# Patient Record
Sex: MALE | Race: BLACK OR AFRICAN AMERICAN | NOT HISPANIC OR LATINO | Employment: FULL TIME | URBAN - METROPOLITAN AREA
[De-identification: names, ages, dates, MRNs, and addresses within clinical notes are randomized per-mention and may not be internally consistent; named-entity substitution may affect disease eponyms.]

---

## 2022-01-13 ENCOUNTER — OFFICE VISIT (OUTPATIENT)
Dept: UROLOGY | Facility: CLINIC | Age: 37
End: 2022-01-13
Attending: UROLOGY
Payer: COMMERCIAL

## 2022-01-13 VITALS — DIASTOLIC BLOOD PRESSURE: 79 MMHG | HEART RATE: 72 BPM | SYSTOLIC BLOOD PRESSURE: 130 MMHG

## 2022-01-13 DIAGNOSIS — R10.30 INGUINAL PAIN, UNSPECIFIED LATERALITY: Primary | ICD-10-CM

## 2022-01-13 PROCEDURE — 99203 OFFICE O/P NEW LOW 30 MIN: CPT | Mod: S$GLB,,, | Performed by: UROLOGY

## 2022-01-13 PROCEDURE — 99203 PR OFFICE/OUTPT VISIT, NEW, LEVL III, 30-44 MIN: ICD-10-PCS | Mod: S$GLB,,, | Performed by: UROLOGY

## 2022-01-13 NOTE — PROGRESS NOTES
Subjective:      Andre Colvin is a 36 y.o. male who was self-referred for evaluation of groin pain.    He reports left sided groin pain for 1 week which has been intermittent, not associated with movement. Rates the pain 2/10, more discomfort. He reports pain in left testicle for a couple days which has now resolved. He also reports delayed ejaculation. He also reports urinary frequency associated with 160oz of liquids per day. He denies dysuria, hematuria, purulent drainage from penis.    The following portions of the patient's history were reviewed and updated as appropriate: allergies, current medications, past family history, past medical history, past social history, past surgical history and problem list.    Review of Systems  Constitutional: no fever or chills  ENT: no nasal congestion or sore throat  Respiratory: no cough or shortness of breath  Cardiovascular: no chest pain or palpitations  Gastrointestinal: no nausea or vomiting, tolerating diet  Genitourinary: as per HPI  Hematologic/Lymphatic: no easy bruising or lymphadenopathy  Musculoskeletal: no arthralgias or myalgias  Neurological: no seizures or tremors  Behavioral/Psych: no auditory or visual hallucinations     Objective:   Vitals: /79 (BP Location: Right arm, Patient Position: Sitting, BP Method: Large (Automatic))   Pulse 72     Physical Exam   General: alert and oriented, no acute distress  Head: normocephalic, atraumatic  Neck: supple, no lymphadenopathy, normal ROM, no masses  Respiratory: Symmetric expansion, non-labored breathing  Cardiovascular: regular rate and rhythm, nomal pulses, no peripheral edema  Abdomen: soft, non tender, non distended, no palpable masses, no hernias, no hepatomegaly or splenomegaly  Genitourinary:   Penis: circumcised, normal, no lesions, patent orthotopic meatus, no plaques  Scrotum: no rashes or skin changes;   Testes: descended bilaterally, no masses, nontender, normal epididymides bilaterally,  no hydroceles  Lymphatic: no inguinal nodes  Skin: normal coloration and turgor, no rashes, no suspicious skin lesions noted  Neuro: alert and oriented x3, no gross deficits  Psych: normal judgment and insight, normal mood/affect and non-anxious    Lab Review   Urinalysis demonstrates negative for all components  Lab Results   Component Value Date    WBC 5.83 02/01/2014    HGB 14.2 02/01/2014    HCT 43.8 02/01/2014    MCV 84 02/01/2014     02/01/2014     Lab Results   Component Value Date    CREATININE 1.0 02/01/2014    BUN 11 02/01/2014     No results found for: PSA  Imaging N/A    Assessment:   No diagnosis found.    Plan:   1. Discussed conservative measures for relieving testicular pain - scrotal support, ibuprofen, scrotal elevation, ice packs  2. Send urine for STI tests  3. RTC PRN

## 2022-08-29 ENCOUNTER — OFFICE VISIT (OUTPATIENT)
Dept: PRIMARY CARE CLINIC | Facility: CLINIC | Age: 37
End: 2022-08-29
Payer: COMMERCIAL

## 2022-08-29 VITALS
SYSTOLIC BLOOD PRESSURE: 130 MMHG | RESPIRATION RATE: 18 BRPM | HEART RATE: 76 BPM | WEIGHT: 200.75 LBS | DIASTOLIC BLOOD PRESSURE: 80 MMHG | HEIGHT: 69 IN | BODY MASS INDEX: 29.73 KG/M2 | OXYGEN SATURATION: 96 %

## 2022-08-29 DIAGNOSIS — Z72.0 TOBACCO USE: ICD-10-CM

## 2022-08-29 DIAGNOSIS — Z13.220 ENCOUNTER FOR LIPID SCREENING FOR CARDIOVASCULAR DISEASE: ICD-10-CM

## 2022-08-29 DIAGNOSIS — R51.9 NONINTRACTABLE HEADACHE, UNSPECIFIED CHRONICITY PATTERN, UNSPECIFIED HEADACHE TYPE: Primary | ICD-10-CM

## 2022-08-29 DIAGNOSIS — Z00.00 ANNUAL PHYSICAL EXAM: ICD-10-CM

## 2022-08-29 DIAGNOSIS — G89.29 BILATERAL CHRONIC KNEE PAIN: ICD-10-CM

## 2022-08-29 DIAGNOSIS — L70.0 ACNE VULGARIS: ICD-10-CM

## 2022-08-29 DIAGNOSIS — Z13.6 ENCOUNTER FOR LIPID SCREENING FOR CARDIOVASCULAR DISEASE: ICD-10-CM

## 2022-08-29 DIAGNOSIS — L70.0 BLACK HEAD: ICD-10-CM

## 2022-08-29 DIAGNOSIS — Z11.59 NEED FOR HEPATITIS C SCREENING TEST: ICD-10-CM

## 2022-08-29 DIAGNOSIS — M25.561 BILATERAL CHRONIC KNEE PAIN: ICD-10-CM

## 2022-08-29 DIAGNOSIS — M25.562 BILATERAL CHRONIC KNEE PAIN: ICD-10-CM

## 2022-08-29 DIAGNOSIS — B35.1 ONYCHOMYCOSIS OF TOENAIL: ICD-10-CM

## 2022-08-29 PROCEDURE — 99213 PR OFFICE/OUTPT VISIT, EST, LEVL III, 20-29 MIN: ICD-10-PCS | Mod: S$PBB,,, | Performed by: INTERNAL MEDICINE

## 2022-08-29 PROCEDURE — 99213 OFFICE O/P EST LOW 20 MIN: CPT | Mod: S$PBB,,, | Performed by: INTERNAL MEDICINE

## 2022-08-29 PROCEDURE — 99999 PR PBB SHADOW E&M-EST. PATIENT-LVL V: ICD-10-PCS | Mod: PBBFAC,,, | Performed by: INTERNAL MEDICINE

## 2022-08-29 PROCEDURE — 99999 PR PBB SHADOW E&M-EST. PATIENT-LVL V: CPT | Mod: PBBFAC,,, | Performed by: INTERNAL MEDICINE

## 2022-08-29 RX ORDER — ERYTHROMYCIN AND BENZOYL PEROXIDE 30; 50 MG/G; MG/G
GEL TOPICAL 2 TIMES DAILY
Qty: 23.3 G | Refills: 0 | Status: SHIPPED | OUTPATIENT
Start: 2022-08-29

## 2022-08-29 RX ORDER — BUTALBITAL, ACETAMINOPHEN AND CAFFEINE 50; 325; 40 MG/1; MG/1; MG/1
1 TABLET ORAL EVERY 6 HOURS PRN
Qty: 30 TABLET | Refills: 0 | Status: SHIPPED | OUTPATIENT
Start: 2022-08-29 | End: 2022-09-28

## 2022-08-29 NOTE — PROGRESS NOTES
Subjective:       Patient ID: Andre Colvin is a 37 y.o. male.    Chief Complaint: Gastroesophageal Reflux, Annual Exam, Knee Pain (Bilateral), Headache, and Referral (Dermatology)    HPI  patient visit today for routine follow-up and annual exam he had chronic bilateral knee pain and fatigue tired left knee worse than right knee patient due contraction work on his feet all day long carry heavy stuffs he also have intermittent headache mainly around the left forehead left by also the back of the head hurt when cough he take over-the-counter medication night ibuprofen Tylenol which has been helping the pain and headache he also come pain of the fungus infection in the nail lose mainly to big toe bilaterally he denies short of breath chest pain dyspnea with exertion weight gain weight loss or any change in bowel habit  Review of Systems   Constitutional:  Negative for unexpected weight change.   Respiratory:  Negative for shortness of breath.    Cardiovascular:  Negative for chest pain.   Gastrointestinal:  Negative for abdominal pain.   Musculoskeletal:  Positive for arthralgias.   Psychiatric/Behavioral:  Negative for dysphoric mood.      Objective:      Physical Exam  Vitals and nursing note reviewed.   Constitutional:       General: He is not in acute distress.     Appearance: He is well-developed.   HENT:      Head: Normocephalic and atraumatic.      Right Ear: External ear normal.      Left Ear: External ear normal.      Nose: Nose normal.      Mouth/Throat:      Pharynx: No oropharyngeal exudate.   Eyes:      Extraocular Movements: Extraocular movements intact.      Conjunctiva/sclera: Conjunctivae normal.      Pupils: Pupils are equal, round, and reactive to light.   Neck:      Thyroid: No thyromegaly.   Cardiovascular:      Rate and Rhythm: Normal rate and regular rhythm.      Heart sounds: Normal heart sounds. No murmur heard.    No friction rub. No gallop.   Pulmonary:      Effort: Pulmonary effort  is normal. No respiratory distress.      Breath sounds: Normal breath sounds. No wheezing.   Abdominal:      General: Bowel sounds are normal. There is no distension.      Palpations: Abdomen is soft.      Tenderness: There is no abdominal tenderness.   Musculoskeletal:         General: Tenderness (A bilateral knee tenderness left worse than right no swelling no erythema no warmth to touch) present. No deformity. Normal range of motion.      Cervical back: Normal range of motion and neck supple.   Lymphadenopathy:      Cervical: No cervical adenopathy.   Skin:     General: Skin is warm and dry.      Findings: No erythema or rash.   Neurological:      Mental Status: He is alert and oriented to person, place, and time.   Psychiatric:         Mood and Affect: Mood normal.         Thought Content: Thought content normal.         Judgment: Judgment normal.       Assessment:       1. Nonintractable headache, unspecified chronicity pattern, unspecified headache type    2. Black head    3. Encounter for lipid screening for cardiovascular disease    4. Annual physical exam    5. Tobacco use    6. Onychomycosis of toenail    7. Bilateral chronic knee pain    8. Need for hepatitis C screening test    9. Acne vulgaris          Plan:       Nonintractable headache, unspecified chronicity pattern, unspecified headache type  -     butalbital-acetaminophen-caffeine -40 mg (FIORICET, ESGIC) -40 mg per tablet; Take 1 tablet by mouth every 6 (six) hours as needed for Headaches.  Dispense: 30 tablet; Refill: 0    Black head    Encounter for lipid screening for cardiovascular disease  -     Lipid Panel; Future; Expected date: 08/29/2022    Annual physical exam  -     CBC Auto Differential; Future; Expected date: 08/29/2022  -     Urinalysis  -     Comprehensive Metabolic Panel; Future; Expected date: 08/29/2022    Tobacco use  -     Ambulatory referral/consult to Smoking Cessation Program; Future; Expected date: 09/05/2022  -      X-Ray Chest PA And Lateral; Future; Expected date: 08/29/2022    Onychomycosis of toenail  -     Comprehensive Metabolic Panel; Future; Expected date: 08/29/2022    Bilateral chronic knee pain  -     X-Ray Knee 3 View Bilateral; Future; Expected date: 08/29/2022    Need for hepatitis C screening test  -     Hepatitis C Antibody; Future; Expected date: 08/29/2022  -     HIV 1/2 Ag/Ab (4th Gen); Future; Expected date: 08/29/2022    Acne vulgaris  -     benzoyl peroxide-erythromycin (BENZAMYCIN) gel; Apply topically 2 (two) times daily. Apply lesions forehead BID  Dispense: 23.3 g; Refill: 0      Medication List with Changes/Refills   New Medications    BENZOYL PEROXIDE-ERYTHROMYCIN (BENZAMYCIN) GEL    Apply topically 2 (two) times daily. Apply lesions forehead BID    BUTALBITAL-ACETAMINOPHEN-CAFFEINE -40 MG (FIORICET, ESGIC) -40 MG PER TABLET    Take 1 tablet by mouth every 6 (six) hours as needed for Headaches.   Discontinued Medications    KETOCONAZOLE (NIZORAL) 2 % CREAM    Apply topically once daily. Apply 2x per day for 7 days

## 2022-09-27 ENCOUNTER — PATIENT MESSAGE (OUTPATIENT)
Dept: PRIMARY CARE CLINIC | Facility: CLINIC | Age: 37
End: 2022-09-27